# Patient Record
Sex: FEMALE | Race: BLACK OR AFRICAN AMERICAN | NOT HISPANIC OR LATINO | Employment: FULL TIME | URBAN - METROPOLITAN AREA
[De-identification: names, ages, dates, MRNs, and addresses within clinical notes are randomized per-mention and may not be internally consistent; named-entity substitution may affect disease eponyms.]

---

## 2017-12-20 ENCOUNTER — TELEPHONE (OUTPATIENT)
Dept: OBSTETRICS AND GYNECOLOGY | Facility: CLINIC | Age: 37
End: 2017-12-20

## 2017-12-20 NOTE — TELEPHONE ENCOUNTER
----- Message from Ashlyn Mccarty sent at 12/20/2017  4:19 PM CST -----  Contact: Pt  Please give pt a call at ..[phone] (home) regarding an appt for a annual and pt missed her cycle for 8 days. I offered the first available and she needs to be seen before then and only by that provider.

## 2018-01-29 ENCOUNTER — TELEPHONE (OUTPATIENT)
Dept: OBSTETRICS AND GYNECOLOGY | Facility: CLINIC | Age: 38
End: 2018-01-29

## 2018-01-29 NOTE — TELEPHONE ENCOUNTER
----- Message from Sylwia Sheehan sent at 1/29/2018 12:13 PM CST -----  Contact: Pt  Pt called and stated she needed to speak to the nurse. She stated she needs to schedule her annual pap smear and needs to be squeezed into the schedule as soon as possible. She can be reached at [phone].    Thanks,  TF

## 2018-01-29 NOTE — TELEPHONE ENCOUNTER
Spoke with pt. Pt states she needs an annual and a pap smear. Scheduled for the next available appt. 3/14/18 at 2:00PM with Dr. Knight at the [facility]. Pt verbalized understanding.

## 2018-02-28 ENCOUNTER — HOSPITAL ENCOUNTER (OUTPATIENT)
Dept: RADIOLOGY | Facility: HOSPITAL | Age: 38
Discharge: HOME | End: 2018-02-28
Attending: NURSE PRACTITIONER
Payer: MEDICAID

## 2018-02-28 DIAGNOSIS — S69.90XA INJURY, HAND: ICD-10-CM

## 2018-02-28 DIAGNOSIS — S69.90XA INJURY, HAND: Primary | ICD-10-CM

## 2018-02-28 PROCEDURE — 73130 X-RAY EXAM OF HAND: CPT | Mod: TC,RT

## 2022-11-29 PROBLEM — D25.9 UTERINE LEIOMYOMA: Status: ACTIVE | Noted: 2022-11-29
